# Patient Record
Sex: MALE | Race: BLACK OR AFRICAN AMERICAN | NOT HISPANIC OR LATINO | Employment: UNEMPLOYED | URBAN - NONMETROPOLITAN AREA
[De-identification: names, ages, dates, MRNs, and addresses within clinical notes are randomized per-mention and may not be internally consistent; named-entity substitution may affect disease eponyms.]

---

## 2019-11-03 ENCOUNTER — HOSPITAL ENCOUNTER (EMERGENCY)
Facility: HOSPITAL | Age: 29
Discharge: HOME | End: 2019-11-03
Attending: EMERGENCY MEDICINE | Admitting: EMERGENCY MEDICINE

## 2019-11-03 ENCOUNTER — APPOINTMENT (OUTPATIENT)
Dept: CT IMAGING | Facility: HOSPITAL | Age: 29
End: 2019-11-03

## 2019-11-03 VITALS
HEART RATE: 61 BPM | OXYGEN SATURATION: 100 % | BODY MASS INDEX: 27.83 KG/M2 | SYSTOLIC BLOOD PRESSURE: 149 MMHG | RESPIRATION RATE: 18 BRPM | HEIGHT: 73 IN | WEIGHT: 210 LBS | DIASTOLIC BLOOD PRESSURE: 82 MMHG | TEMPERATURE: 98 F

## 2019-11-03 DIAGNOSIS — R11.2 NON-INTRACTABLE VOMITING WITH NAUSEA, UNSPECIFIED VOMITING TYPE: Primary | ICD-10-CM

## 2019-11-03 DIAGNOSIS — F14.10 COCAINE ABUSE (HCC): ICD-10-CM

## 2019-11-03 DIAGNOSIS — F10.10 ETOH ABUSE: ICD-10-CM

## 2019-11-03 LAB
ALBUMIN SERPL-MCNC: 4.3 G/DL (ref 3.5–5.2)
ALBUMIN/GLOB SERPL: 1.3 G/DL
ALP SERPL-CCNC: 50 U/L (ref 39–117)
ALT SERPL W P-5'-P-CCNC: 20 U/L (ref 1–41)
AMPHET+METHAMPHET UR QL: NEGATIVE
AMPHETAMINES UR QL: NEGATIVE
ANION GAP SERPL CALCULATED.3IONS-SCNC: 12 MMOL/L (ref 5–15)
AST SERPL-CCNC: 39 U/L (ref 1–40)
BARBITURATES UR QL SCN: NEGATIVE
BASOPHILS # BLD AUTO: 0.03 10*3/MM3 (ref 0–0.2)
BASOPHILS NFR BLD AUTO: 0.4 % (ref 0–1.5)
BENZODIAZ UR QL SCN: NEGATIVE
BILIRUB SERPL-MCNC: 0.3 MG/DL (ref 0.2–1.2)
BILIRUB UR QL STRIP: NEGATIVE
BUN BLD-MCNC: 13 MG/DL (ref 6–20)
BUN/CREAT SERPL: 10.8 (ref 7–25)
BUPRENORPHINE SERPL-MCNC: NEGATIVE NG/ML
CALCIUM SPEC-SCNC: 9.2 MG/DL (ref 8.6–10.5)
CANNABINOIDS SERPL QL: POSITIVE
CHLORIDE SERPL-SCNC: 105 MMOL/L (ref 98–107)
CLARITY UR: CLEAR
CO2 SERPL-SCNC: 25 MMOL/L (ref 22–29)
COCAINE UR QL: POSITIVE
COLOR UR: YELLOW
CREAT BLD-MCNC: 1.2 MG/DL (ref 0.76–1.27)
DEPRECATED RDW RBC AUTO: 40.5 FL (ref 37–54)
EOSINOPHIL # BLD AUTO: 0.31 10*3/MM3 (ref 0–0.4)
EOSINOPHIL NFR BLD AUTO: 3.9 % (ref 0.3–6.2)
ERYTHROCYTE [DISTWIDTH] IN BLOOD BY AUTOMATED COUNT: 15.9 % (ref 12.3–15.4)
ETHANOL BLD-MCNC: 54 MG/DL (ref 0–10)
ETHANOL UR QL: 0.05 %
GFR SERPL CREATININE-BSD FRML MDRD: 86 ML/MIN/1.73
GLOBULIN UR ELPH-MCNC: 3.2 GM/DL
GLUCOSE BLD-MCNC: 89 MG/DL (ref 65–99)
GLUCOSE UR STRIP-MCNC: NEGATIVE MG/DL
HCT VFR BLD AUTO: 39.8 % (ref 37.5–51)
HGB BLD-MCNC: 12.7 G/DL (ref 13–17.7)
HGB UR QL STRIP.AUTO: NEGATIVE
IMM GRANULOCYTES # BLD AUTO: 0.01 10*3/MM3 (ref 0–0.05)
IMM GRANULOCYTES NFR BLD AUTO: 0.1 % (ref 0–0.5)
KETONES UR QL STRIP: NEGATIVE
LEUKOCYTE ESTERASE UR QL STRIP.AUTO: NEGATIVE
LIPASE SERPL-CCNC: 12 U/L (ref 13–60)
LYMPHOCYTES # BLD AUTO: 2.34 10*3/MM3 (ref 0.7–3.1)
LYMPHOCYTES NFR BLD AUTO: 29.8 % (ref 19.6–45.3)
MCH RBC QN AUTO: 23.4 PG (ref 26.6–33)
MCHC RBC AUTO-ENTMCNC: 31.9 G/DL (ref 31.5–35.7)
MCV RBC AUTO: 73.4 FL (ref 79–97)
METHADONE UR QL SCN: NEGATIVE
MICROCYTES BLD QL: NORMAL
MONOCYTES # BLD AUTO: 0.55 10*3/MM3 (ref 0.1–0.9)
MONOCYTES NFR BLD AUTO: 7 % (ref 5–12)
NEUTROPHILS # BLD AUTO: 4.62 10*3/MM3 (ref 1.7–7)
NEUTROPHILS NFR BLD AUTO: 58.8 % (ref 42.7–76)
NITRITE UR QL STRIP: NEGATIVE
NRBC BLD AUTO-RTO: 0 /100 WBC (ref 0–0.2)
OPIATES UR QL: NEGATIVE
OXYCODONE UR QL SCN: NEGATIVE
PCP UR QL SCN: NEGATIVE
PH UR STRIP.AUTO: 6 [PH] (ref 5–8)
PLATELET # BLD AUTO: 211 10*3/MM3 (ref 140–450)
PMV BLD AUTO: 10.2 FL (ref 6–12)
POTASSIUM BLD-SCNC: 3.8 MMOL/L (ref 3.5–5.2)
PROPOXYPH UR QL: NEGATIVE
PROT SERPL-MCNC: 7.5 G/DL (ref 6–8.5)
PROT UR QL STRIP: NEGATIVE
RBC # BLD AUTO: 5.42 10*6/MM3 (ref 4.14–5.8)
SMALL PLATELETS BLD QL SMEAR: ADEQUATE
SODIUM BLD-SCNC: 142 MMOL/L (ref 136–145)
SP GR UR STRIP: 1.02 (ref 1–1.03)
TRICYCLICS UR QL SCN: NEGATIVE
TROPONIN T SERPL-MCNC: <0.01 NG/ML (ref 0–0.03)
UROBILINOGEN UR QL STRIP: NORMAL
WBC MORPH BLD: NORMAL
WBC NRBC COR # BLD: 7.86 10*3/MM3 (ref 3.4–10.8)

## 2019-11-03 PROCEDURE — 25010000002 ONDANSETRON PER 1 MG: Performed by: EMERGENCY MEDICINE

## 2019-11-03 PROCEDURE — 80307 DRUG TEST PRSMV CHEM ANLYZR: CPT | Performed by: EMERGENCY MEDICINE

## 2019-11-03 PROCEDURE — 85025 COMPLETE CBC W/AUTO DIFF WBC: CPT | Performed by: EMERGENCY MEDICINE

## 2019-11-03 PROCEDURE — 93005 ELECTROCARDIOGRAM TRACING: CPT | Performed by: EMERGENCY MEDICINE

## 2019-11-03 PROCEDURE — 96374 THER/PROPH/DIAG INJ IV PUSH: CPT

## 2019-11-03 PROCEDURE — 85007 BL SMEAR W/DIFF WBC COUNT: CPT | Performed by: EMERGENCY MEDICINE

## 2019-11-03 PROCEDURE — 99284 EMERGENCY DEPT VISIT MOD MDM: CPT

## 2019-11-03 PROCEDURE — 96376 TX/PRO/DX INJ SAME DRUG ADON: CPT

## 2019-11-03 PROCEDURE — 83690 ASSAY OF LIPASE: CPT | Performed by: EMERGENCY MEDICINE

## 2019-11-03 PROCEDURE — 80053 COMPREHEN METABOLIC PANEL: CPT | Performed by: EMERGENCY MEDICINE

## 2019-11-03 PROCEDURE — 70450 CT HEAD/BRAIN W/O DYE: CPT

## 2019-11-03 PROCEDURE — 81003 URINALYSIS AUTO W/O SCOPE: CPT | Performed by: EMERGENCY MEDICINE

## 2019-11-03 PROCEDURE — 84484 ASSAY OF TROPONIN QUANT: CPT | Performed by: EMERGENCY MEDICINE

## 2019-11-03 RX ORDER — ALUMINA, MAGNESIA, AND SIMETHICONE 2400; 2400; 240 MG/30ML; MG/30ML; MG/30ML
15 SUSPENSION ORAL ONCE
Status: COMPLETED | OUTPATIENT
Start: 2019-11-03 | End: 2019-11-03

## 2019-11-03 RX ORDER — ONDANSETRON 2 MG/ML
4 INJECTION INTRAMUSCULAR; INTRAVENOUS ONCE
Status: COMPLETED | OUTPATIENT
Start: 2019-11-03 | End: 2019-11-03

## 2019-11-03 RX ORDER — ONDANSETRON 4 MG/1
4 TABLET, ORALLY DISINTEGRATING ORAL EVERY 6 HOURS PRN
Qty: 10 TABLET | Refills: 0 | OUTPATIENT
Start: 2019-11-03 | End: 2020-11-13

## 2019-11-03 RX ORDER — LORAZEPAM 2 MG/ML
1 INJECTION INTRAMUSCULAR ONCE
Status: DISCONTINUED | OUTPATIENT
Start: 2019-11-03 | End: 2019-11-03 | Stop reason: HOSPADM

## 2019-11-03 RX ORDER — LIDOCAINE HYDROCHLORIDE 20 MG/ML
15 SOLUTION OROPHARYNGEAL ONCE
Status: COMPLETED | OUTPATIENT
Start: 2019-11-03 | End: 2019-11-03

## 2019-11-03 RX ORDER — FAMOTIDINE 40 MG/1
40 TABLET, FILM COATED ORAL DAILY
Qty: 30 TABLET | Refills: 0 | Status: SHIPPED | OUTPATIENT
Start: 2019-11-03

## 2019-11-03 RX ADMIN — LIDOCAINE HYDROCHLORIDE 15 ML: 20 SOLUTION ORAL; TOPICAL at 12:27

## 2019-11-03 RX ADMIN — ONDANSETRON 4 MG: 2 INJECTION INTRAMUSCULAR; INTRAVENOUS at 12:25

## 2019-11-03 RX ADMIN — SODIUM CHLORIDE 1000 ML: 9 INJECTION, SOLUTION INTRAVENOUS at 10:55

## 2019-11-03 RX ADMIN — ALUMINUM HYDROXIDE, MAGNESIUM HYDROXIDE, AND DIMETHICONE 15 ML: 400; 400; 40 SUSPENSION ORAL at 12:27

## 2019-11-03 RX ADMIN — ONDANSETRON 4 MG: 2 INJECTION INTRAMUSCULAR; INTRAVENOUS at 10:53

## 2019-11-03 NOTE — ED NOTES
While pt was being triaged, he stood up and began vomiting. While vomiting, he had a syncopal episode, struck head on bedside table. Dr Watts and Ani RN in room. Pt was passed out for approximately 10-15 seconds. Pt came to, was awake and alert. Pt assisted in to bed. Pt denies any pain at this time     Kathy Ovalles, RN  11/03/19 2141

## 2019-11-03 NOTE — ED NOTES
Patient states that he is unable to give a urine sample at this time. Family is at bedside, he will notify us when he is able to give us a sample.     Amina Gallegos  11/03/19 1127

## 2019-11-03 NOTE — ED PROVIDER NOTES
Subjective   29 year old male presenting with nausea/vomiting. He states that this morning he stood up to vomit, became light headed, nearly passed out and had vomiting. This persisted throughout the morning prompting his visit here. When he stood up here in the ED he had a large amount of vomitus and proceeded to pass out, he fell backwards and struck his head on a Tejada stand.  He quickly came around.  He does note drinking 2 fifths of alcohol last night.  He denies any abdominal pain, fevers, chills, numbness, weakness or other complaints.            Review of Systems   Constitutional: Negative.    HENT: Negative.    Eyes: Negative.    Respiratory: Negative.    Cardiovascular: Negative.    Gastrointestinal: Positive for nausea and vomiting. Negative for abdominal pain.   Genitourinary: Negative.    Musculoskeletal: Negative.    Skin: Negative.    Neurological: Positive for syncope and light-headedness. Negative for weakness and numbness.   Psychiatric/Behavioral: Negative.        History reviewed. No pertinent past medical history.    No Known Allergies    History reviewed. No pertinent surgical history.    History reviewed. No pertinent family history.    Social History     Socioeconomic History   • Marital status: Single     Spouse name: Not on file   • Number of children: Not on file   • Years of education: Not on file   • Highest education level: Not on file   Tobacco Use   • Smoking status: Current Every Day Smoker     Packs/day: 1.00   • Smokeless tobacco: Never Used   Substance and Sexual Activity   • Alcohol use: Yes     Comment: daily -1/5th liquor   • Drug use: No           Objective   Physical Exam   Constitutional: He is oriented to person, place, and time. He appears well-developed and well-nourished. No distress.   HENT:   Head: Normocephalic and atraumatic.   Right Ear: External ear normal.   Left Ear: External ear normal.   Nose: Nose normal.   Mouth/Throat: Oropharynx is clear and moist.   Eyes:  Conjunctivae and EOM are normal. Pupils are equal, round, and reactive to light.   Neck: Normal range of motion. Neck supple.   Full range of motion, no tenderness   Cardiovascular: Normal rate, regular rhythm, normal heart sounds and intact distal pulses.   Pulmonary/Chest: Effort normal and breath sounds normal. No respiratory distress.   Abdominal: Soft. Bowel sounds are normal. He exhibits no distension. There is no tenderness. There is no rebound and no guarding.   Musculoskeletal: Normal range of motion. He exhibits no edema, tenderness or deformity.   Neurological: He is alert and oriented to person, place, and time.   Normal strength and sensation, cranial nerves intact   Skin: Skin is warm and dry. No rash noted.   Psychiatric: He has a normal mood and affect. His behavior is normal.   Nursing note and vitals reviewed.      Procedures           ED Course                  MDM  Number of Diagnoses or Management Options  Cocaine abuse (CMS/[facility]):   ETOH abuse:   Non-intractable vomiting with nausea, unspecified vomiting type:   Diagnosis management comments: 29-year-old male with vomiting, syncope.  Well-developed, well-nourished young man in no distress with exam as above.  Will obtain labs, EKG, head CT.  We will give symptomatic treatment.  Disposition pending work-up.    DDX: Viral illness, alcohol gastritis, dehydration, arrhythmia, orthostasis    EKG interpreted by me: Sinus rhythm, normal rate, right axis, no acute ST/T changes, this is an abnormal EKG, there are no previous for comparison    Work-up here is largely unremarkable.  Urine drug screen is positive for cocaine and marijuana.  He is feeling much better after treatment.  I do feel he is safe for discharge home.       Amount and/or Complexity of Data Reviewed  Clinical lab tests: reviewed  Tests in the radiology section of CPT®: reviewed        Final diagnoses:   Non-intractable vomiting with nausea, unspecified vomiting type   ETOH abuse    Cocaine abuse (CMS/[facility])              Jerry Watts MD  11/03/19 4397

## 2020-02-28 ENCOUNTER — HOSPITAL ENCOUNTER (EMERGENCY)
Facility: HOSPITAL | Age: 30
Discharge: HOME | End: 2020-02-28
Attending: EMERGENCY MEDICINE | Admitting: EMERGENCY MEDICINE

## 2020-02-28 VITALS
DIASTOLIC BLOOD PRESSURE: 82 MMHG | HEIGHT: 73 IN | OXYGEN SATURATION: 95 % | TEMPERATURE: 100 F | SYSTOLIC BLOOD PRESSURE: 124 MMHG | WEIGHT: 204.4 LBS | HEART RATE: 91 BPM | BODY MASS INDEX: 27.09 KG/M2 | RESPIRATION RATE: 18 BRPM

## 2020-02-28 DIAGNOSIS — J10.1 INFLUENZA B: Primary | ICD-10-CM

## 2020-02-28 LAB — S PYO AG THROAT QL: NEGATIVE

## 2020-02-28 PROCEDURE — 87081 CULTURE SCREEN ONLY: CPT | Performed by: EMERGENCY MEDICINE

## 2020-02-28 PROCEDURE — 87804 INFLUENZA ASSAY W/OPTIC: CPT | Performed by: EMERGENCY MEDICINE

## 2020-02-28 PROCEDURE — 87880 STREP A ASSAY W/OPTIC: CPT | Performed by: EMERGENCY MEDICINE

## 2020-02-28 PROCEDURE — 99283 EMERGENCY DEPT VISIT LOW MDM: CPT

## 2020-02-28 RX ORDER — IBUPROFEN 800 MG/1
800 TABLET ORAL ONCE
Status: COMPLETED | OUTPATIENT
Start: 2020-02-28 | End: 2020-02-28

## 2020-02-28 RX ADMIN — IBUPROFEN 800 MG: 800 TABLET ORAL at 03:49

## 2020-03-01 LAB — BACTERIA SPEC AEROBE CULT: NORMAL

## 2020-03-09 LAB
FLUAV AG NPH QL: NEGATIVE
FLUBV AG NPH QL IA: POSITIVE

## 2023-09-25 ENCOUNTER — APPOINTMENT (OUTPATIENT)
Dept: GENERAL RADIOLOGY | Facility: HOSPITAL | Age: 33
End: 2023-09-25
Payer: MEDICAID

## 2023-09-25 ENCOUNTER — HOSPITAL ENCOUNTER (EMERGENCY)
Facility: HOSPITAL | Age: 33
Discharge: HOME | End: 2023-09-25
Attending: EMERGENCY MEDICINE | Admitting: EMERGENCY MEDICINE
Payer: MEDICAID

## 2023-09-25 VITALS
BODY MASS INDEX: 25.9 KG/M2 | HEIGHT: 73 IN | DIASTOLIC BLOOD PRESSURE: 47 MMHG | OXYGEN SATURATION: 99 % | TEMPERATURE: 97.7 F | SYSTOLIC BLOOD PRESSURE: 120 MMHG | HEART RATE: 77 BPM | WEIGHT: 195.4 LBS | RESPIRATION RATE: 18 BRPM

## 2023-09-25 DIAGNOSIS — J06.9 VIRAL URI WITH COUGH: Primary | ICD-10-CM

## 2023-09-25 LAB
FLUAV RNA RESP QL NAA+PROBE: NOT DETECTED
FLUBV RNA RESP QL NAA+PROBE: NOT DETECTED
S PYO AG THROAT QL: NEGATIVE
SARS-COV-2 RNA RESP QL NAA+PROBE: NOT DETECTED

## 2023-09-25 PROCEDURE — 99283 EMERGENCY DEPT VISIT LOW MDM: CPT

## 2023-09-25 PROCEDURE — 63710000001 PREDNISONE PER 5 MG: Performed by: EMERGENCY MEDICINE

## 2023-09-25 PROCEDURE — 87880 STREP A ASSAY W/OPTIC: CPT | Performed by: EMERGENCY MEDICINE

## 2023-09-25 PROCEDURE — 71045 X-RAY EXAM CHEST 1 VIEW: CPT

## 2023-09-25 PROCEDURE — 87636 SARSCOV2 & INF A&B AMP PRB: CPT | Performed by: EMERGENCY MEDICINE

## 2023-09-25 PROCEDURE — 87081 CULTURE SCREEN ONLY: CPT | Performed by: EMERGENCY MEDICINE

## 2023-09-25 RX ORDER — PREDNISONE 20 MG/1
20 TABLET ORAL 2 TIMES DAILY
Qty: 10 TABLET | Refills: 0 | Status: SHIPPED | OUTPATIENT
Start: 2023-09-25 | End: 2023-09-30

## 2023-09-25 RX ORDER — ALBUTEROL SULFATE 90 UG/1
2 AEROSOL, METERED RESPIRATORY (INHALATION) ONCE
Status: COMPLETED | OUTPATIENT
Start: 2023-09-25 | End: 2023-09-25

## 2023-09-25 RX ADMIN — PREDNISONE 60 MG: 50 TABLET ORAL at 06:10

## 2023-09-25 RX ADMIN — ALBUTEROL SULFATE 2 PUFF: 90 AEROSOL, METERED RESPIRATORY (INHALATION) at 06:11

## 2023-09-25 NOTE — ED PROVIDER NOTES
Subjective   History of Present Illness  33-year-old male presents to ED with chief complaint of flulike symptoms.  Patient states that he has not felt well for 2 days.  He has had cough congestion wheeze and intermittent chest tightness.  States that he woke up tonight and was having a little increased shortness of breath.  Feels better after going into the cool air.  Has had some postnasal drip.  No fever.  Cough is nonproductive sputum.  No shortness of breath at this time.  No chest pain.  No other complaints at this time.    Review of Systems   HENT:  Positive for congestion and postnasal drip.    Respiratory:  Positive for cough and wheezing.    Cardiovascular:  Negative for chest pain, palpitations and leg swelling.   All other systems reviewed and are negative.    Past Medical History:   Diagnosis Date    GERD (gastroesophageal reflux disease)        No Known Allergies    Past Surgical History:   Procedure Laterality Date    LEG SURGERY      w/pinning       Family History   Problem Relation Age of Onset    No Known Problems Mother     No Known Problems Father        Social History     Socioeconomic History    Marital status: Single   Tobacco Use    Smoking status: Every Day     Packs/day: 1.00     Types: Cigarettes    Smokeless tobacco: Never   Substance and Sexual Activity    Alcohol use: Not Currently    Drug use: No    Sexual activity: Defer           Objective   Physical Exam  Vitals and nursing note reviewed.   Constitutional:       General: He is not in acute distress.     Appearance: He is well-developed. He is not diaphoretic.   HENT:      Head: Normocephalic and atraumatic.      Nose: Nose normal.   Eyes:      Conjunctiva/sclera: Conjunctivae normal.      Pupils: Pupils are equal, round, and reactive to light.   Cardiovascular:      Rate and Rhythm: Normal rate and regular rhythm.      Pulses: Normal pulses.   Pulmonary:      Effort: Pulmonary effort is normal. No respiratory distress.      Breath  sounds: Wheezing present.      Comments: Faint wheeze bilateral lung fields  Abdominal:      General: There is no distension.      Palpations: Abdomen is soft.      Tenderness: There is no abdominal tenderness.   Musculoskeletal:         General: No deformity.   Neurological:      Mental Status: He is alert and oriented to person, place, and time.      Cranial Nerves: No cranial nerve deficit.      Coordination: Coordination normal.       Procedures           ED Course                                           Medical Decision Making  Amount and/or Complexity of Data Reviewed  Radiology: ordered.    Risk  Prescription drug management.    Data interpreted: Nursing notes reviewed vital signs reviewed Labs independently interpreted interpretative by me.  Imaging interpretative by me.  EKG independently interpreted by me    Oxygen saturations included.    Counseling discussed the results above with the patient regarding need for admission or discharge.  Patient understands and agrees with plan of care      33-year-old male presents to the ED complaining of flulike symptoms with cough congestion wheeze and nasal drainage.  COVID/flu negative, chest x-ray negative for acute process.  Strep negative.  Suspect viral URI.  Treated with albuterol and prednisone.  Appropriate for discharge.  Will prescribe steroids for acute viral bronchitis.  Patient read with this plan.  Next    Final diagnoses:   Viral URI with cough       ED Disposition  ED Disposition       ED Disposition   Discharge    Condition   Stable    Comment   --               PATIENT CONNECTION - [facility] [address]  [phone]             Medication List        New Prescriptions      predniSONE 20 MG tablet  Commonly known as: DELTASONE  Take 1 tablet by mouth 2 (Two) Times a Day for 5 days.               Where to Get Your Medications        These medications were sent to [place], [place] - [address] -  [phone]  - [phone] FX  [address] [address]      Phone: [phone]   predniSONE 20 MG tablet            Tony Antonio,   09/25/23 0617

## 2023-09-25 NOTE — Clinical Note
[facility] EMERGENCY DEPARTMENT  [address] [address]  Phone: [phone]    Terrance Germain accompanied Mark Araya to the emergency department on 9/25/2023. They may return to school on 09/26/2023.          Thank you for choosing [facility].      Tony Antonio, DO

## 2023-09-25 NOTE — Clinical Note
[facility] EMERGENCY DEPARTMENT  [address] [address]  Phone: [phone]    Terrance Germain accompanied Mark Araya to the emergency department on 9/25/2023. They may return to school on 09/26/2023.          Thank you for choosing [facility].      Tony Antonio, DO

## 2023-09-27 LAB — BACTERIA SPEC AEROBE CULT: NORMAL

## 2024-02-15 ENCOUNTER — APPOINTMENT (OUTPATIENT)
Dept: GENERAL RADIOLOGY | Facility: HOSPITAL | Age: 34
End: 2024-02-15
Payer: MEDICAID

## 2024-02-15 ENCOUNTER — HOSPITAL ENCOUNTER (EMERGENCY)
Facility: HOSPITAL | Age: 34
Discharge: HOME | End: 2024-02-15
Attending: EMERGENCY MEDICINE
Payer: MEDICAID

## 2024-02-15 VITALS
SYSTOLIC BLOOD PRESSURE: 111 MMHG | TEMPERATURE: 98 F | HEIGHT: 73 IN | RESPIRATION RATE: 22 BRPM | OXYGEN SATURATION: 99 % | BODY MASS INDEX: 27.83 KG/M2 | HEART RATE: 65 BPM | DIASTOLIC BLOOD PRESSURE: 77 MMHG | WEIGHT: 210 LBS

## 2024-02-15 DIAGNOSIS — S20.211A CONTUSION OF RIGHT CHEST WALL, INITIAL ENCOUNTER: ICD-10-CM

## 2024-02-15 DIAGNOSIS — R07.89 CHEST WALL PAIN: Primary | ICD-10-CM

## 2024-02-15 PROCEDURE — 71045 X-RAY EXAM CHEST 1 VIEW: CPT

## 2024-02-15 PROCEDURE — 99282 EMERGENCY DEPT VISIT SF MDM: CPT

## 2024-02-15 RX ORDER — HYDROCODONE BITARTRATE AND ACETAMINOPHEN 5; 325 MG/1; MG/1
1 TABLET ORAL EVERY 6 HOURS PRN
Qty: 5 TABLET | Refills: 0 | Status: SHIPPED | OUTPATIENT
Start: 2024-02-15

## 2024-02-15 NOTE — ED PROVIDER NOTES
"Subjective   History of Present Illness  Patient is a very pleasant 34-year-old -American male who injured his chest wall while doing bench press exercises approximately 3 days ago.  He comes in with his significant other who confirms a forementioned story.  Patient tells me he \"had a very bad .\"  Patient tells me he dropped the barbell directly on his lower chest.  He tells me he has had right greater than left chest wall pain in the lower ribs on the right and on the left.  Denies any difficulty breathing but tells me that when he takes a deep breath, it hurts this area more than usual.  He has felt no \"crunching\".  He denies any difficulty breathing.  Denies any hemoptysis.  Denies any shortness of breath.  Denies abdominal pain.  Denies any nausea vomiting or diarrhea.  He has been acting appropriately.  Denies any back pain.  Significant other confirms that the patient is at baseline with regard to mental status.  Patient is in good spirits, oriented x 3 and in no acute distress.  He has an ice pack across his chest when I evaluate the patient.    Patient tells me that when he twist side-to-side it makes the pain worse.    Past medical history  GERD.  Negative for hypertension, dyslipidemia or diabetes.    Family history  Negative for CAD      Review of Systems   Constitutional: Negative.  Negative for chills, fatigue, fever and unexpected weight change.   HENT:  Negative for dental problem, ear pain, hearing loss and sinus pressure.    Eyes:  Negative for pain and visual disturbance.   Respiratory:  Negative for chest tightness and shortness of breath.    Cardiovascular:  Positive for chest pain. Negative for palpitations and leg swelling.        Chest wall pain, right anterior lower chest wall greater than left   Gastrointestinal:  Negative for blood in stool, diarrhea, nausea and vomiting.   Genitourinary:  Negative for difficulty urinating, dysuria, frequency, hematuria and urgency. "   Musculoskeletal:  Negative for myalgias, neck pain and neck stiffness.   Neurological:  Negative for seizures, syncope, speech difficulty, light-headedness and headaches.   Psychiatric/Behavioral:  Negative for confusion.    All other systems reviewed and are negative.      Past Medical History:   Diagnosis Date    GERD (gastroesophageal reflux disease)        No Known Allergies    Past Surgical History:   Procedure Laterality Date    LEG SURGERY      w/pinning       Family History   Problem Relation Age of Onset    No Known Problems Mother     No Known Problems Father        Social History     Socioeconomic History    Marital status: Single   Tobacco Use    Smoking status: Every Day     Packs/day: 1     Types: Cigarettes    Smokeless tobacco: Never   Substance and Sexual Activity    Alcohol use: Not Currently    Drug use: No    Sexual activity: Defer           Objective   Physical Exam  Vitals and nursing note reviewed.   Constitutional:       General: He is not in acute distress.     Appearance: He is well-developed. He is not toxic-appearing.   HENT:      Head: Normocephalic and atraumatic.      Jaw: No trismus.      Right Ear: Ear canal and external ear normal.      Left Ear: Ear canal and external ear normal.      Nose: Nose normal.      Mouth/Throat:      Mouth: Mucous membranes are moist. Mucous membranes are not dry. No oral lesions.      Dentition: No dental abscesses.      Pharynx: Oropharynx is clear. No posterior oropharyngeal erythema or uvula swelling.      Tonsils: No tonsillar exudate or tonsillar abscesses.   Eyes:      Extraocular Movements: Extraocular movements intact.      Conjunctiva/sclera: Conjunctivae normal.      Right eye: Right conjunctiva is not injected.      Left eye: Left conjunctiva is not injected.      Pupils: Pupils are equal, round, and reactive to light.   Neck:      Vascular: No JVD.      Trachea: No tracheal tenderness.   Cardiovascular:      Rate and Rhythm: Normal rate and  regular rhythm.      Heart sounds: Normal heart sounds.      No friction rub. No gallop.   Pulmonary:      Effort: Pulmonary effort is normal.      Breath sounds: Normal breath sounds. No wheezing or rales.      Comments: No crepitance noted.  Left anterior lower chest wall tender.  Same in the left but not as severe as the right.  No CVA tenderness.  Chest:      Chest wall: Tenderness present.   Abdominal:      General: Bowel sounds are normal. There is no distension.      Palpations: Abdomen is soft. Abdomen is not rigid. There is no mass or pulsatile mass.      Tenderness: There is no abdominal tenderness. There is no guarding or rebound. Negative signs include McBurney's sign.      Comments: No signs of acute abdomen.  No pain at McBurney's point.  No pulsatile abdominal mass.   Musculoskeletal:         General: No tenderness or deformity. Normal range of motion.      Cervical back: Normal range of motion and neck supple. No rigidity. Normal range of motion.      Comments: No C, T, or L-spine bony midline tenderness palpation.  No step-offs or deformities.   Lymphadenopathy:      Cervical: No cervical adenopathy.   Skin:     General: Skin is warm and dry.      Findings: No erythema or rash.      Comments: No diaphoresis, lesions, nevi, petechia, purpura   Neurological:      Mental Status: He is alert and oriented to person, place, and time.      Cranial Nerves: No cranial nerve deficit.      Sensory: No sensory deficit.      Motor: No tremor or abnormal muscle tone.      Comments: 5/5 strength bilaterally with flexion and extension of fingers, wrist, elbows, knees and hips as well as plantar and dorsiflexion of the foot.   Psychiatric:         Attention and Perception: He is attentive.         Speech: Speech normal.         Behavior: Behavior normal.         Thought Content: Thought content normal.         Judgment: Judgment normal.         Procedures           ED Course  ED Course as of 02/15/24 1628   Thu Feb  15, 2024   4782 I had a nice conversation with the patient and his significant other.  We talked about rib fracture, pneumothorax, contusion.  Patient has no crepitance.  We have talked about the potential for pneumonia as he will likely be splinting and taking smaller breaths than usual because of the pain.  Patient verbalized full understanding of all of this.  I told him I would refer him to a good primary care physician, we talked about Motrin, Tylenol and some pain medication for breakthrough pain.  I have a 2 view chest x-ray ordered.  If unremarkable as anticipated, patient will be discharged home.  I think it is unlikely the patient will have a pneumothorax.  If so, we have already talked about the potential need for a chest tube.  Will diagnose patient with chest wall contusion.  Chest wall pain.  Will have the patient follow-up with PCP in the next week, use ice and heat as we discussed.  Tylenol Motrin over-the-counter as discussed at the bedside and Lortab for pain control.  Patient verbalized full understanding of the plan, as well as appreciation for it.  Final impression and plan following completion of workup. [SAVANA]   0992 Chest x-ray has been completed.  I have reviewed images independently.  My independent interpretation showed no evidence of effusion or pneumothorax.  Official radiologic read has been provided and I have reviewed the study results personally.  Radiologist confirm my aforementioned independent interpretation and no evidence of acute disease process has been noted.    Have talked the patient about symptomatic care.  We have talked about the need to use Tylenol, Motrin, ice and heat and in addition he has received  regarding the pain medication prescribed.  Patient and his girlfriend verbalized full understanding of the plan.  I told him to come back for new or changing concerns.  We talked about the potential for an undiagnosed or nonvisualized fracture and he has verbalized  "understanding of this risk.  Patient will be discharged home accordingly. [SAVANA]   1001 Patient and significant other verbalized full understanding of the plan and patient will be discharged home accordingly. [SAVANA]      ED Course User Index  [SAVANA] Fawad Hsu MD      No results found for this or any previous visit (from the past 24 hour(s)).  Note: In addition to lab results from this visit, the labs listed above may include labs taken at another facility or during a different encounter within the last 24 hours. Please correlate lab times with ED admission and discharge times for further clarification of the services performed during this visit.    XR Chest 1 View   Final Result   Unremarkable portable chest.                This report was signed and finalized on 2/15/2024 9:43 AM by Juan Babb MD.            Vitals:    02/15/24 0857   BP: 111/77   Pulse: 65   Resp: 22   Temp: 98 °F (36.7 °C)   SpO2: 99%   Weight: 95.3 kg (210 lb)   Height: 185.4 cm (73\")     Medications - No data to display  ECG/EMG Results (last 24 hours)       ** No results found for the last 24 hours. **          No orders to display                                     BAIRON reviewed by Fawad Hsu MD       Medical Decision Making  Must consider strain, contusion, pneumothorax and rib fracture.  Based on the location, initially I considered abdominal trauma based on clinical exam, all discomfort is in the lower chest wall.  Absolutely zero abdominal tenderness to palpation or expressed discomfort.    Problems Addressed:  Chest wall pain: complicated acute illness or injury  Contusion of right chest wall, initial encounter: complicated acute illness or injury    Amount and/or Complexity of Data Reviewed  Independent Historian: spouse  Radiology: ordered. Decision-making details documented in ED Course.    Risk  Prescription drug management.        Final diagnoses:   Chest wall pain   Contusion of right chest wall, initial " encounter       ED Disposition  ED Disposition       ED Disposition   Discharge    Condition   Stable    Comment   --               PATIENT CONNECTION - [facility] [address]  [phone]  In 1 week           Medication List        New Prescriptions      HYDROcodone-acetaminophen 5-325 MG per tablet  Commonly known as: NORCO  Take 1 tablet by mouth Every 6 (Six) Hours As Needed for Moderate Pain or Severe Pain.               Where to Get Your Medications        These medications were sent to [facility] [place], [place] - [facility] [address] - [phone]  - [phone] [address] [place] [address] [address] [facility] [address]      Phone: [phone]   HYDROcodone-acetaminophen 5-325 MG per tablet            Fawad Hsu MD  02/15/24 6397

## 2024-02-15 NOTE — DISCHARGE INSTRUCTIONS
Tylenol, Motrin, ice and heat as we discussed at bedside.    I prescribed you pain medication for breakthrough pain.  Take as prescribed.    Return to the emergency department immediately for new or change concerns.    Call to make an appointment with the PCP.  Follow-up in the next week as we discussed.      Please review the medications you are supposed to be taking according to prior physician recommendations. I have not changed your home medications during this visit. If your discharge instructions indicate that I have changed your home medications, this is not the case, and you should disregard. If you have any questions about the medication you should be taking at home, please call your physician.

## 2025-06-26 ENCOUNTER — HOSPITAL ENCOUNTER (EMERGENCY)
Facility: HOSPITAL | Age: 35
Discharge: HOME | End: 2025-06-27
Attending: EMERGENCY MEDICINE
Payer: MEDICAID

## 2025-06-26 DIAGNOSIS — L03.211 FACIAL CELLULITIS: Primary | ICD-10-CM

## 2025-06-26 PROCEDURE — 99283 EMERGENCY DEPT VISIT LOW MDM: CPT | Performed by: EMERGENCY MEDICINE

## 2025-06-26 RX ORDER — CEPHALEXIN 500 MG/1
500 CAPSULE ORAL 3 TIMES DAILY
Qty: 21 CAPSULE | Refills: 0 | Status: SHIPPED | OUTPATIENT
Start: 2025-06-26 | End: 2025-07-03

## 2025-06-26 RX ORDER — SULFAMETHOXAZOLE AND TRIMETHOPRIM 800; 160 MG/1; MG/1
1 TABLET ORAL 2 TIMES DAILY
Qty: 20 TABLET | Refills: 0 | Status: SHIPPED | OUTPATIENT
Start: 2025-06-26 | End: 2025-07-06

## 2025-06-26 RX ORDER — SULFAMETHOXAZOLE AND TRIMETHOPRIM 800; 160 MG/1; MG/1
1 TABLET ORAL ONCE
Status: COMPLETED | OUTPATIENT
Start: 2025-06-27 | End: 2025-06-27

## 2025-06-27 VITALS
BODY MASS INDEX: 27.85 KG/M2 | HEIGHT: 73 IN | WEIGHT: 210.1 LBS | TEMPERATURE: 98 F | SYSTOLIC BLOOD PRESSURE: 132 MMHG | HEART RATE: 70 BPM | RESPIRATION RATE: 16 BRPM | OXYGEN SATURATION: 98 % | DIASTOLIC BLOOD PRESSURE: 80 MMHG

## 2025-06-27 RX ADMIN — SULFAMETHOXAZOLE AND TRIMETHOPRIM 1 TABLET: 800; 160 TABLET ORAL at 00:10

## 2025-06-27 RX ADMIN — CEPHALEXIN 500 MG: 250 CAPSULE ORAL at 00:11

## 2025-06-27 NOTE — ED PROVIDER NOTES
EMERGENCY DEPARTMENT ENCOUNTER    Pt Name: Mark Araya  MRN: [id number]  Pt :   1990  Room Number:  19SF/19  Date of encounter:  2025  PCP: Daly Darby APRN  ED Provider: Lb Pugh PA-C    Historian: Patient      HPI:  Chief Complaint   Patient presents with    Abscess          Context: Mark Araya is a 35 y.o. male who presents to the ED c/o pain and tenderness with some swelling to the left mandible.  States he has had history of abscesses and ingrown hairs to his face before.  Usually was able to squeeze them and get some pus out however this just feels like a firm area of some mild overlying redness.  Has been intermittently waxing and waning over the past couple weeks but just got worse the past couple days.      PAST MEDICAL HISTORY  Past Medical History:   Diagnosis Date    GERD (gastroesophageal reflux disease)          PAST SURGICAL HISTORY  Past Surgical History:   Procedure Laterality Date    LEG SURGERY      w/pinning         FAMILY HISTORY  Family History   Problem Relation Age of Onset    No Known Problems Mother     No Known Problems Father          SOCIAL HISTORY  Social History     Socioeconomic History    Marital status: Single   Tobacco Use    Smoking status: Every Day     Current packs/day: 1.00     Types: Cigarettes    Smokeless tobacco: Never   Substance and Sexual Activity    Alcohol use: Not Currently    Drug use: No    Sexual activity: Defer         ALLERGIES  Patient has no known allergies.        REVIEW OF SYSTEMS  Review of Systems   Constitutional: Negative.    HENT: Negative.  Negative for dental problem and trouble swallowing.    Eyes: Negative.    Respiratory: Negative.     Cardiovascular: Negative.    Gastrointestinal: Negative.    Genitourinary: Negative.    Musculoskeletal: Negative.    Skin: Negative.         Swelling and tenderness to the left mandible   Allergic/Immunologic: Negative.    Neurological: Negative.     Psychiatric/Behavioral: Negative.     All other systems reviewed and are negative.       All systems reviewed and negative except for those discussed in HPI.       PHYSICAL EXAM    I have reviewed the triage vital signs and nursing notes.    ED Triage Vitals [06/26/25 2216]   Temp Heart Rate Resp BP SpO2   97.8 °F (36.6 °C) 77 18 137/83 98 %      Temp src Heart Rate Source Patient Position BP Location FiO2 (%)   Oral Monitor Sitting Left arm --       Physical Exam  Vitals and nursing note reviewed.   Constitutional:       General: He is not in acute distress.     Appearance: Normal appearance. He is normal weight. He is not ill-appearing, toxic-appearing or diaphoretic.   HENT:      Head: Normocephalic and atraumatic.      Jaw: There is normal jaw occlusion.        Comments: Area of induration and mild overlying erythema to the left mandible.  No evidence of Darin angina, soft nontender submental area, no dental pain.  No fluctuance indicating abscess amenable to I&D at this time.  No enlarged salivary glands.     Nose: Nose normal.      Mouth/Throat:      Mouth: Mucous membranes are moist.      Pharynx: Oropharynx is clear. No oropharyngeal exudate or posterior oropharyngeal erythema.   Eyes:      Extraocular Movements: Extraocular movements intact.   Cardiovascular:      Rate and Rhythm: Normal rate.   Pulmonary:      Effort: Pulmonary effort is normal.   Abdominal:      General: Abdomen is flat.   Musculoskeletal:         General: Normal range of motion.      Cervical back: Normal range of motion and neck supple.   Skin:     General: Skin is warm and dry.   Neurological:      General: No focal deficit present.      Mental Status: He is alert. Mental status is at baseline.   Psychiatric:         Mood and Affect: Mood normal.         Behavior: Behavior normal.            LAB RESULTS  No results found for this or any previous visit (from the past 24 hours).    If labs were ordered, I independently reviewed the  results and considered them in treating the patient.        RADIOLOGY  No Radiology Exams Resulted Within Past 24 Hours        PROCEDURES    Procedures    Interpretations    O2 Sat: The patient's oxygen saturation was 98% on Room Air.  This was independently interpreted by me as Normal    MEDICATIONS GIVEN IN ER    Medications   cephalexin (KEFLEX) capsule 500 mg (has no administration in time range)   sulfamethoxazole-trimethoprim (BACTRIM DS,SEPTRA DS) 800-160 MG per tablet 1 tablet (has no administration in time range)         MEDICAL DECISION MAKING, PROGRESS, and CONSULTS    All labs, if obtained, have been independently reviewed by me.  All radiology studies, if obtained, have been reviewed by me and the radiologist dictating the report.  All EKG's, if obtained, have been independently viewed and interpreted by me      Discussion below represents my analysis of pertinent findings related to patient's condition, differential diagnosis, treatment plan and final disposition.      Differential diagnosis:    Abscess, cellulitis, furuncle, carbuncle, ingrown hair    Additional Sources:  None      Orders placed during this visit:  No orders of the defined types were placed in this encounter.        Additional orders considered but not ordered:  None    ED Course:    Consultants:  None    ED Course as of 06/27/25 0000   Fri Jun 27, 2025   0000 Findings most consistent with cellulitis at this time.  No fluctuance or area of abscess that appears to be amenable to I&D.  No evidence of deep space abscess, airway patient, tolerating p.o. well, no difficulty swallowing or breathing.  Will cover on Bactrim and Keflex and have him continue warm compresses and return to the ER for any new or worsening symptoms peer [TM]      ED Course User Index  [TM] Lb Pugh PA-C           After my consideration of clinical presentation and any laboratory/radiology studies obtained, I discussed the findings with the  patient/patient representative who is in agreement with the treatment plan and the final disposition. Risks and benefits of discharge were discussed.     AS OF 00:00 EDT VITALS:    BP - 137/83  HR - 77  TEMP - 97.8 °F (36.6 °C) (Oral)  O2 SATS - 98%    I reviewed the patient's prescription monitoring report if available prior to discharge    DIAGNOSIS  Final diagnoses:   Facial cellulitis         DISPOSITION  ED Disposition       ED Disposition   Discharge    Condition   Stable    Comment   --                   Please note that portions of this document were completed with voice recognition software.        Lb Pugh PA-C  06/27/25 0000